# Patient Record
Sex: MALE | URBAN - METROPOLITAN AREA
[De-identification: names, ages, dates, MRNs, and addresses within clinical notes are randomized per-mention and may not be internally consistent; named-entity substitution may affect disease eponyms.]

---

## 2019-09-30 ENCOUNTER — APPOINTMENT (RX ONLY)
Dept: URBAN - METROPOLITAN AREA CLINIC 89 | Facility: CLINIC | Age: 44
Setting detail: DERMATOLOGY
End: 2019-09-30

## 2019-09-30 DIAGNOSIS — L30.9 DERMATITIS, UNSPECIFIED: ICD-10-CM

## 2019-09-30 PROCEDURE — ? COUNSELING

## 2019-09-30 PROCEDURE — ? TREATMENT REGIMEN

## 2019-09-30 PROCEDURE — ? ADDITIONAL NOTES

## 2019-09-30 PROCEDURE — 99202 OFFICE O/P NEW SF 15 MIN: CPT

## 2019-09-30 PROCEDURE — ? RECOMMENDATIONS

## 2019-09-30 PROCEDURE — ? PRESCRIPTION

## 2019-09-30 RX ORDER — HYDROXYZINE HYDROCHLORIDE 25 MG/1
1 TABLET, FILM COATED ORAL QD
Qty: 60 | Refills: 2 | Status: ERX | COMMUNITY
Start: 2019-09-30

## 2019-09-30 RX ADMIN — HYDROXYZINE HYDROCHLORIDE 1: 25 TABLET, FILM COATED ORAL at 18:22

## 2019-09-30 ASSESSMENT — LOCATION SIMPLE DESCRIPTION DERM
LOCATION SIMPLE: POSTERIOR NECK
LOCATION SIMPLE: LEFT ELBOW
LOCATION SIMPLE: RIGHT UPPER ARM

## 2019-09-30 ASSESSMENT — LOCATION ZONE DERM
LOCATION ZONE: ARM
LOCATION ZONE: NECK

## 2019-09-30 ASSESSMENT — LOCATION DETAILED DESCRIPTION DERM
LOCATION DETAILED: RIGHT DISTAL POSTERIOR UPPER ARM
LOCATION DETAILED: LEFT LATERAL ELBOW
LOCATION DETAILED: MID POSTERIOR NECK

## 2019-09-30 NOTE — PROCEDURE: RECOMMENDATIONS
Detail Level: Zone
Recommendation Preamble: Dry skin instructions and cerave
Recommendations (Free Text): Antihistamines bid

## 2019-09-30 NOTE — PROCEDURE: TREATMENT REGIMEN
Initiate Treatment: DSI, CeraVe (anti-itch)\\nAntihistamines bid-tid\\nTAC 0.1% ointment bid x 1-2 week prn flares
Detail Level: Zone

## 2019-09-30 NOTE — PROCEDURE: ADDITIONAL NOTES
Additional Notes: Previously biopsied, will request Path report from [facility].
Detail Level: Simple